# Patient Record
(demographics unavailable — no encounter records)

---

## 2025-01-02 NOTE — PHYSICAL EXAM
[de-identified] : Gen: NAD Resp: Nonlabored respirations, no SOB Gait: nl  Side: Skin in tact Tenderness:   Hip ROM                               Flexion              Extension               IR               ER Affected               120                    10                         20              30 Normal                  120                    10                         25              40   Strength                              Flexion              Extension           Abduction        Adduction  Affected               5                        5                               5                     5                     Normal                  5                        5                               5                     5  Provocative Tests: Log roll  (-) FADIR   (+) MCKENNA   (-) Latisha   (-) Resisted SLR  (-)   [de-identified] : I independently reviewed and interpreted the MRI of the hip - I discussed with the patient the MRI demonstrates moderate CAM lesion, likely labral tear, PRUDENCIO

## 2025-01-02 NOTE — HISTORY OF PRESENT ILLNESS
[de-identified] : 17yo healthy boy pw R hip pain.  He presents with increasing hip and groin pain.  Pt notes extending sitting and especially soccer worsens the hip pain and symptoms.  Does not recall an inciting traumatic event.  Pt has tried activity modification and NSAIDs with minimal relief, pain level remains a 6/10.  Feels activity level has changed and difficulty participating in sports.  Some tightness noted in hip.  Has trialed physical therapy but not injections.  Pt denies numbness, paresthesias, f/c.  (+) clicking inside hip Pt notes the pain interferes with activities of daily life and that overall mobility has been decreased.  They wish to discuss possible treatment options and return to baseline activity and mobility before symptomatic onset.  MRI complete

## 2025-01-02 NOTE — DISCUSSION/SUMMARY
[de-identified] : 19yo healthy boy pw R hip PRUDENCIO.  The patient was extensively counseled on treatment options including but not limited to observation, rest/activity modification, bracing, anti-inflammatory medications, physical therapy, injections, and surgery.  The natural history of the disease was thoroughly explained.  We discussed that the majority of the time, this condition can be initially treated conservatively. The patient will proceed with: -referral to Dr. Browning for eval of possible US guide cs inj -PT -pt was instructed on the importance of resting, icing and elevating to minimize swelling -RTC 6w - can discuss possible hip arthroscopy pending results of inj + PT   I have personally obtained the history, reviewed the ROS as noted, and performed the physical examination today.  The patient and I discussed the assessment and options and developed the plan.  All questions were answered and the patient stated their understanding of the treatment plan and appreciation of the visit.   My cumulative time spent on this patient's visit included: Preparation for the visit, review of the medical records, review of pertinent diagnostic studies, examination and counseling of the patient on the above diagnosis, treatment plan and prognosis, orders of diagnostic tests, medications and/or appropriate procedures and documentation in the medical records of today's visit.   Maximus Stanton MD

## 2025-01-09 NOTE — HISTORY OF PRESENT ILLNESS
[de-identified] : 19 yo male with hx of CP, recently seen by orthopeadic surgery for right hip impingement with evidence fo acetabular impingement with mild joint effusion on MRI presenting today for consultation symptoms originally started while playing soccer, patient had to miss the soccer season due to pain; did PT x2/wk x6wks with some improvement of symptoms hip flared again in Oct and patient was referred by Dr. Stanton for right hip CSI consult denies any significant pain at this time, does feel the left hip is stiff, endorses decrease in internal rotate

## 2025-01-09 NOTE — DISCUSSION/SUMMARY
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  He was already seen by orthopedic specialist and advised that he is not yet a candidate for surgical intervention.  He was advised to seek consultation regarding possible injections.  I had a lengthy discussion with the patient and his father regarding usage and timing of cortisone injection, they are both advised that this is primarily to address short-term pain relief to get more benefit from things like physical therapy and activity modification.  Currently the patient is not having any pain, recommend deferral of cortisone injection in a young healthy male who is not actively having pain.  If he develops any pain we may consider cortisone injection as a future treatment option.  Patient will continue his course of physical therapy and activity modification.  If he does not have relief of pain he may follow-up with his surgeon to discuss risk/benefits of hip preservation procedure.  Followup as needed.  Patient and his father appreciate and agree with current plan.  This note was generated using dragon medical dictation software.  A reasonable effort has been made for proofreading its contents, but typos may still remain.  If there are any questions or points of clarification needed please notify my office.

## 2025-01-09 NOTE — PHYSICAL EXAM
[de-identified] : Constitutional: Well-nourished, well-developed, No acute distress Respiratory:  Good respiratory effort, no SOB Psychiatric: Pleasant and normal affect, alert and oriented x3 Skin: Clean dry and intact B/L LE Musculoskeletal: normal except where as noted in regional exam    Right Hip:   APPEARANCE: no marked deformities, no swelling or malalignment POSITIVE TENDERNESS: Hip flexor region, sartorius, proximal rectus femoris NONTENDER: greater trochanter, TFL, gluteal region, ischium/proximal hamstring region, and pubic symphysis. ROM: Limited in all planes due to pain. RESISTIVE TESTING: MMT 4+/5 and mild pain with hip flexion, painless resisted ER/IR/SLR/abduction/adduction. SPECIAL TESTS: + FADIR, neg MCKENNA, mild pain with loaded flexion & scouring. neg fulcrum test

## 2025-01-09 NOTE — PHYSICAL EXAM
[de-identified] : Constitutional: Well-nourished, well-developed, No acute distress Respiratory:  Good respiratory effort, no SOB Psychiatric: Pleasant and normal affect, alert and oriented x3 Skin: Clean dry and intact B/L LE Musculoskeletal: normal except where as noted in regional exam    Right Hip:   APPEARANCE: no marked deformities, no swelling or malalignment POSITIVE TENDERNESS: Hip flexor region, sartorius, proximal rectus femoris NONTENDER: greater trochanter, TFL, gluteal region, ischium/proximal hamstring region, and pubic symphysis. ROM: Limited in all planes due to pain. RESISTIVE TESTING: MMT 4+/5 and mild pain with hip flexion, painless resisted ER/IR/SLR/abduction/adduction. SPECIAL TESTS: + FADIR, neg MCKENNA, mild pain with loaded flexion & scouring. neg fulcrum test

## 2025-01-09 NOTE — HISTORY OF PRESENT ILLNESS
[de-identified] : 19 yo male with hx of CP, recently seen by orthopeadic surgery for right hip impingement with evidence fo acetabular impingement with mild joint effusion on MRI presenting today for consultation symptoms originally started while playing soccer, patient had to miss the soccer season due to pain; did PT x2/wk x6wks with some improvement of symptoms hip flared again in Oct and patient was referred by Dr. Stanton for right hip CSI consult denies any significant pain at this time, does feel the left hip is stiff, endorses decrease in internal rotate

## 2025-01-09 NOTE — DISCUSSION/SUMMARY
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  He was already seen by orthopedic specialist and advised that he is not yet a candidate for surgical intervention.  He was advised to seek consultation regarding possible injections.  I had a lengthy discussion with the patient and his father regarding usage and timing of cortisone injection, they are both advised that this is primarily to address short-term pain relief to get more benefit from things like physical therapy and activity modification.  Currently the patient is not having any pain, recommend deferral of cortisone injection in a young healthy male who is not actively having pain.  If he develops any pain we may consider cortisone injection as a future treatment option.  Patient will continue his course of physical therapy and activity modification.  If he does not have relief of pain he may follow-up with his surgeon to discuss risk/benefits of hip preservation procedure.  Followup as needed.  Patient and his father appreciate and agree with current plan.  This note was generated using dragon medical dictation software.  A reasonable effort has been made for proofreading its contents, but typos may still remain.  If there are any questions or points of clarification needed please notify my office.

## 2025-07-08 NOTE — PLAN
[Provision of National Suicide Prevention Lifeline 4-574-298-TALK (7494)] : Provision of national suicide prevention lifeline 1-374-644-talk (3769) [Patient] : patient [Family] : family [Education provided regarding environmental safety/ lethal means restriction] : Education provided regarding environmental safety/ lethal means restriction

## 2025-07-08 NOTE — PLAN
[Provision of National Suicide Prevention Lifeline 8-200-897-TALK (1533)] : Provision of national suicide prevention lifeline 1-042-436-talk (9856) [Patient] : patient [Family] : family [Education provided regarding environmental safety/ lethal means restriction] : Education provided regarding environmental safety/ lethal means restriction

## 2025-07-08 NOTE — RISK ASSESSMENT
[Clinical Interview] : Clinical Interview [In last 30 days] : in the last 30 days [No] : No [Yes (details below)] : yes [Yes] : yes [None Known] : none known [Yes, more than 3 months ago] : yes, more than 3 months ago [Elopement history/risk] : elopement history or risk

## 2025-07-08 NOTE — PHYSICAL EXAM
[Average] : average [Cooperative] : cooperative [Euthymic] : euthymic [Full] : full [Clear] : clear [Linear/Goal Directed] : linear/goal directed [Circumstantial] : circumstantial [Preoccupations/Ruminations] : preoccupations/ruminations [None Reported] : none reported [WNL] : within normal limits

## 2025-07-08 NOTE — REASON FOR VISIT
[Behavioral Health Urgent Care Assessment] : a behavioral health urgent care assessment [School] : school [Patient] : patient [Self] : alone [Mother] : with mother

## 2025-07-18 NOTE — PLAN
[Cognitive and/or Behavior Therapy] : Cognitive and/or Behavior Therapy  [Psychoeducation] : Psychoeducation  [Skills training (all types)] : Skills training (all types)  [Supportive Therapy] : Supportive Therapy [FreeTextEntry2] : reduce symptoms of anxiety, social anxiety, poor boundaries and developing coping skills to manage stress [de-identified] : Session focused on providing psychoeducation, goals for treatment, importance of homework outside of therapy and providing support. Pt arrived to [facility] on time and presented with strong smelling cologne. Provider discussed identifying appropriate goals for therapy given the time constraints. Pt expresses wanting to speak to females but not doing so. Pt acknowledges h/o poor boundaries with older females who have helped him. Provided pt with positive verbal reinforcement for acknowledging this. Discussed the importance of role-play in session and identifying possible opportunities to increase peer relationships, such as his future roommate in college. Pt reports he has communicated with his roommate via text only. Explored opportunities to connect to former peers from high school. Pt reports he is also hoping to start his part-time employment, which would increase opportunities for social interactions. Pt receptive to suggestions. Pt continues to exhibit some anxiety about being evaluated or saying the wrong things. Provided empathy and support. [FreeTextEntry1] : f/u in one week. Pt agrees to make an attempt to ask his peers for plans and identify other opportunities to increase socialization.

## 2025-07-18 NOTE — PLAN
[Cognitive and/or Behavior Therapy] : Cognitive and/or Behavior Therapy  [Psychoeducation] : Psychoeducation  [Skills training (all types)] : Skills training (all types)  [Supportive Therapy] : Supportive Therapy [FreeTextEntry2] : reduce symptoms of anxiety, social anxiety, poor boundaries and developing coping skills to manage stress [de-identified] : Session focused on providing psychoeducation, goals for treatment, importance of homework outside of therapy and providing support. Pt arrived to [facility] on time and presented with strong smelling cologne. Provider discussed identifying appropriate goals for therapy given the time constraints. Pt expresses wanting to speak to females but not doing so. Pt acknowledges h/o poor boundaries with older females who have helped him. Provided pt with positive verbal reinforcement for acknowledging this. Discussed the importance of role-play in session and identifying possible opportunities to increase peer relationships, such as his future roommate in college. Pt reports he has communicated with his roommate via text only. Explored opportunities to connect to former peers from high school. Pt reports he is also hoping to start his part-time employment, which would increase opportunities for social interactions. Pt receptive to suggestions. Pt continues to exhibit some anxiety about being evaluated or saying the wrong things. Provided empathy and support. [FreeTextEntry1] : f/u in one week. Pt agrees to make an attempt to ask his peers for plans and identify other opportunities to increase socialization.

## 2025-07-24 NOTE — PLAN
[de-identified] : Session focused on reviewing pt's progress towards maintaining boundaries and increasing communication/developing peer relationships. Pt reports he helped a friend. Provided pt with verbal positive reinforcement.

## 2025-07-24 NOTE — PLAN
[de-identified] : Session focused on reviewing pt's progress towards maintaining boundaries and increasing communication/developing peer relationships. Pt reports he helped a friend. Provided pt with verbal positive reinforcement.
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
